# Patient Record
Sex: MALE | Race: WHITE | NOT HISPANIC OR LATINO | Employment: FULL TIME | URBAN - METROPOLITAN AREA
[De-identification: names, ages, dates, MRNs, and addresses within clinical notes are randomized per-mention and may not be internally consistent; named-entity substitution may affect disease eponyms.]

---

## 2023-04-18 ENCOUNTER — APPOINTMENT (OUTPATIENT)
Dept: PRIMARY CARE | Facility: CLINIC | Age: 33
End: 2023-04-18

## 2023-04-18 PROBLEM — F11.11 NARCOTIC ABUSE IN REMISSION (MULTI): Status: ACTIVE | Noted: 2023-04-18

## 2023-04-18 PROBLEM — F15.11 HISTORY OF METHAMPHETAMINE ABUSE (MULTI): Status: ACTIVE | Noted: 2023-04-18

## 2023-04-18 PROBLEM — F41.1 GENERALIZED ANXIETY DISORDER: Status: ACTIVE | Noted: 2023-04-18

## 2023-04-18 PROBLEM — F32.1 MODERATE MAJOR DEPRESSION (MULTI): Status: ACTIVE | Noted: 2023-04-18

## 2023-04-18 PROBLEM — A54.9 GONORRHEA: Status: ACTIVE | Noted: 2023-04-18

## 2023-04-18 PROBLEM — M54.50 INTERMITTENT LOW BACK PAIN: Status: ACTIVE | Noted: 2023-04-18

## 2023-04-18 PROBLEM — R79.89 ELEVATED LFTS: Status: ACTIVE | Noted: 2023-04-18

## 2023-04-18 RX ORDER — SERTRALINE HYDROCHLORIDE 50 MG/1
50 TABLET, FILM COATED ORAL DAILY
COMMUNITY
End: 2024-02-08 | Stop reason: SDUPTHER

## 2023-04-18 RX ORDER — BUPROPION HYDROCHLORIDE 300 MG/1
300 TABLET ORAL DAILY
COMMUNITY
End: 2024-02-08 | Stop reason: SDUPTHER

## 2023-04-18 RX ORDER — TERBINAFINE HYDROCHLORIDE 250 MG/1
1 TABLET ORAL DAILY
COMMUNITY
Start: 2023-01-17 | End: 2024-02-12 | Stop reason: WASHOUT

## 2024-02-08 ENCOUNTER — TELEPHONE (OUTPATIENT)
Dept: PRIMARY CARE | Facility: CLINIC | Age: 34
End: 2024-02-08

## 2024-02-08 DIAGNOSIS — F41.1 GENERALIZED ANXIETY DISORDER: ICD-10-CM

## 2024-02-08 DIAGNOSIS — F32.1 MODERATE MAJOR DEPRESSION (MULTI): ICD-10-CM

## 2024-02-08 RX ORDER — SERTRALINE HYDROCHLORIDE 50 MG/1
50 TABLET, FILM COATED ORAL DAILY
Qty: 90 TABLET | Refills: 1 | Status: SHIPPED | OUTPATIENT
Start: 2024-02-08 | End: 2024-02-12 | Stop reason: SDUPTHER

## 2024-02-08 RX ORDER — BUPROPION HYDROCHLORIDE 300 MG/1
300 TABLET ORAL DAILY
Qty: 90 TABLET | Refills: 1 | Status: SHIPPED | OUTPATIENT
Start: 2024-02-08

## 2024-02-08 NOTE — TELEPHONE ENCOUNTER
Rx Refill Request Telephone Encounter    Name:  Nahid Ash  :  [id number]  Medication Name:      buPROPion XL (Wellbutrin XL) 300 mg 24 hr tablet   Route: Take 1 tablet (300 mg) by mouth once daily    sertraline (Zoloft) 50 mg tablet   Route: Take 1 tablet (50 mg) by mouth once daily.      Specific Pharmacy location:  [id number] [place] [address]   Date of last appointment:  23  Date of next appointment: 24  Best number to reach patient: [phone]

## 2024-02-12 ENCOUNTER — OFFICE VISIT (OUTPATIENT)
Dept: PRIMARY CARE | Facility: CLINIC | Age: 34
End: 2024-02-12

## 2024-02-12 VITALS
OXYGEN SATURATION: 94 % | SYSTOLIC BLOOD PRESSURE: 132 MMHG | HEIGHT: 70 IN | WEIGHT: 221.2 LBS | HEART RATE: 91 BPM | DIASTOLIC BLOOD PRESSURE: 87 MMHG | TEMPERATURE: 97.6 F | RESPIRATION RATE: 18 BRPM | BODY MASS INDEX: 31.67 KG/M2

## 2024-02-12 DIAGNOSIS — Z00.00 ROUTINE MEDICAL EXAM: ICD-10-CM

## 2024-02-12 DIAGNOSIS — Z13.29 THYROID DISORDER SCREEN: ICD-10-CM

## 2024-02-12 DIAGNOSIS — E78.00 ELEVATED LDL CHOLESTEROL LEVEL: ICD-10-CM

## 2024-02-12 DIAGNOSIS — F41.1 GENERALIZED ANXIETY DISORDER: ICD-10-CM

## 2024-02-12 DIAGNOSIS — H61.23 BILATERAL IMPACTED CERUMEN: ICD-10-CM

## 2024-02-12 DIAGNOSIS — R06.83 SNORING: Primary | ICD-10-CM

## 2024-02-12 PROCEDURE — 1036F TOBACCO NON-USER: CPT | Performed by: NURSE PRACTITIONER

## 2024-02-12 PROCEDURE — 99213 OFFICE O/P EST LOW 20 MIN: CPT | Performed by: NURSE PRACTITIONER

## 2024-02-12 PROCEDURE — 69209 REMOVE IMPACTED EAR WAX UNI: CPT | Performed by: NURSE PRACTITIONER

## 2024-02-12 PROCEDURE — 99395 PREV VISIT EST AGE 18-39: CPT | Performed by: NURSE PRACTITIONER

## 2024-02-12 RX ORDER — SERTRALINE HYDROCHLORIDE 25 MG/1
25 TABLET, FILM COATED ORAL DAILY
Start: 2024-02-12

## 2024-02-12 ASSESSMENT — ENCOUNTER SYMPTOMS
FEVER: 0
CHILLS: 0
NERVOUS/ANXIOUS: 0
CONSTITUTIONAL NEGATIVE: 1
VOMITING: 0
APNEA: 0
PALPITATIONS: 0
HEADACHES: 0
CONFUSION: 0
RESPIRATORY NEGATIVE: 1
ACTIVITY CHANGE: 0
NAUSEA: 0
WEAKNESS: 0
FATIGUE: 0
DIZZINESS: 0
ABDOMINAL PAIN: 0
DIARRHEA: 0
SHORTNESS OF BREATH: 0
COUGH: 0

## 2024-02-12 NOTE — ASSESSMENT & PLAN NOTE
Taper down sertraline. Patient wants to trial   Decrease to 25mg daily   Continue wellbutrin   Follow up 6-8 weeks prn otherwise in 6 months   Call for worsening anxiety      For assistance with mental health or substance use concerns, the Mental Health and Recovery Board of [facility] ([email or web address]-[email or web address]/) has two 24 hour assistance lines:  Mental Health: 642-938-HELP (5082) or [phone]  Addiction Helpline: [phone]

## 2024-02-12 NOTE — ASSESSMENT & PLAN NOTE
Ear irrigation in office   Follow up prn   Unsuccessful   Debrox gtts, 1 week nurse visit for irrigation   Felt dizziness during irrigation / stopped and offered water.   Hearing muffled   Not able to visualize TM

## 2024-02-12 NOTE — PROGRESS NOTES
"Subjective   Patient ID: Nahid Ash is a 33 y.o. male who presents for Annual Exam, Snoring, and Ear Fullness (Reports both ears being clogged past month ).    Snoring:   Sleeps typically 8 hours a night   Wife reports snoring   No gasping, or apnea noted    Ears feel Full: Pressure,popping   For a month now   Tinnitus   Denies any recent viral illness   Denies sore throat     Ear Fullness   Pertinent negatives include no abdominal pain, coughing, diarrhea, headaches or vomiting.        Review of Systems   Constitutional: Negative.  Negative for activity change, chills, fatigue and fever.   Respiratory: Negative.  Negative for apnea, cough and shortness of breath.    Cardiovascular:  Negative for chest pain and palpitations.   Gastrointestinal:  Negative for abdominal pain, diarrhea, nausea and vomiting.   Neurological:  Negative for dizziness, weakness and headaches.   Psychiatric/Behavioral:  Negative for confusion. The patient is not nervous/anxious.        Objective   /87   Pulse 91   Temp 36.4 °C (97.6 °F) (Temporal)   Resp 18   Ht 1.778 m (5' 10\")   Wt 100 kg (221 lb 3.2 oz)   SpO2 94%   BMI 31.74 kg/m²     Physical Exam  Vitals reviewed.   Constitutional:       Appearance: Normal appearance.   HENT:      Right Ear: There is impacted cerumen.      Left Ear: There is impacted cerumen.   Cardiovascular:      Rate and Rhythm: Normal rate and regular rhythm.      Pulses: Normal pulses.      Heart sounds: Normal heart sounds.   Pulmonary:      Effort: Pulmonary effort is normal.      Breath sounds: Normal breath sounds.   Neurological:      Mental Status: He is alert and oriented to person, place, and time.   Psychiatric:         Mood and Affect: Mood normal.         Behavior: Behavior normal.     Patient ID: Nahid Ash is a 33 y.o. male.    Ear Cerumen Removal    Date/Time: 2/12/2024 5:24 PM    Performed by: SPENCER Medina  Authorized by: SPENCER Medina    Consent:     " Consent obtained:  Verbal    Consent given by:  Patient    Risks, benefits, and alternatives were discussed: yes      Risks discussed:  Bleeding, TM perforation, dizziness and pain    Alternatives discussed:  No treatment  Universal protocol:     Patient identity confirmed:  Verbally with patient  Procedure details:     Location:  L ear and R ear    Procedure type: irrigation      Procedure outcomes: cerumen removed    Post-procedure details:     Inspection:  No bleeding and some cerumen remaining    Hearing quality:  Diminished    Procedure completion:  Tolerated  Comments:      Not removed in bilateral ears. Heavy cerumen   Recommend debrox      Assessment/Plan   Problem List Items Addressed This Visit             ICD-10-CM    Generalized anxiety disorder F41.1     Taper down sertraline. Patient wants to trial   Decrease to 25mg daily   Continue wellbutrin   Follow up 6-8 weeks prn otherwise in 6 months   Call for worsening anxiety      For assistance with mental health or substance use concerns, the Mental Health and Recovery Board of [facility] ([email or web address]-[email or web address]/) has two 24 hour assistance lines:  Mental Health: 994-380-HELP (4675) or [phone]  Addiction Helpline: [phone]             Relevant Medications    sertraline (Zoloft) 25 mg tablet    Bilateral impacted cerumen H61.23     Ear irrigation in office   Follow up prn   Unsuccessful   Debrox gtts, 1 week nurse visit for irrigation   Felt dizziness during irrigation / stopped and offered water.   Hearing muffled            Relevant Medications    carbamide peroxide (Debrox) 6.5 % otic solution    Other Relevant Orders    Ear Cerumen Removal    Snoring - Primary R06.83     Sleep study ordered   Follow up prn   Trial positioning changes            Relevant Orders    In-Center Sleep Study (Non-Sleep Provider Only)    Elevated LDL cholesterol level E78.00     Lipid panel ordered   Low saturated fat diet            Relevant Orders     CBC and Auto Differential    Comprehensive Metabolic Panel    Lipid Panel    Routine medical exam Z00.00     PE done   Follow up 6 months          Other Visit Diagnoses         Codes    Thyroid disorder screen     Z13.29    Relevant Orders    TSH with reflex to Free T4 if abnormal

## 2024-02-19 ENCOUNTER — CLINICAL SUPPORT (OUTPATIENT)
Dept: PRIMARY CARE | Facility: CLINIC | Age: 34
End: 2024-02-19

## 2024-02-19 DIAGNOSIS — H61.23 IMPACTED CERUMEN OF BOTH EARS: ICD-10-CM

## 2024-02-19 NOTE — PROGRESS NOTES
Patient ID: Nahid Ash is a 33 y.o. male.    Ear Cerumen Removal    Date/Time: 2/19/2024 5:02 PM    Performed by: Saji Matute MA  Authorized by: SPENCER Medina    Consent:     Consent obtained:  Verbal    Consent given by:  Patient    Risks discussed:  Bleeding, incomplete removal, pain and dizziness  Procedure details:     Location:  L ear and R ear    Procedure type: irrigation      Procedure outcomes: unable to remove cerumen    Post-procedure details:     Inspection:  Some cerumen remaining    Hearing quality:  Improved    Procedure completion:  Tolerated  Patient in today for ear irrigation. Patient has been using debrox the past week. Ear irrigation done and removed impacted cerumen completely from left ear. Removed some impacted cerumen from right ear but still impacted. Told patient to continue debrox and follow up if still having issues.

## 2024-08-08 ENCOUNTER — TELEPHONE (OUTPATIENT)
Dept: PRIMARY CARE | Facility: CLINIC | Age: 34
End: 2024-08-08

## 2024-08-08 DIAGNOSIS — F32.1 MODERATE MAJOR DEPRESSION (MULTI): ICD-10-CM

## 2024-08-08 RX ORDER — BUPROPION HYDROCHLORIDE 300 MG/1
300 TABLET ORAL DAILY
Qty: 30 TABLET | Refills: 0 | Status: SHIPPED | OUTPATIENT
Start: 2024-08-08

## 2024-08-08 NOTE — TELEPHONE ENCOUNTER
Rx Refill Request Telephone Encounter    Name:  Nahid Ash  :  [id number]  Medication Name:      buPROPion XL (Wellbutrin XL) 300 mg 24 hr tablet   Route: Take 1 tablet (300 mg) by mouth once daily.       Specific Pharmacy location:  [id number] - [address]   Date of last appointment:  24  Date of next appointment:  24  Best number to reach patient: [phone]

## 2024-08-12 ENCOUNTER — APPOINTMENT (OUTPATIENT)
Dept: PRIMARY CARE | Facility: CLINIC | Age: 34
End: 2024-08-12

## 2024-08-30 ENCOUNTER — APPOINTMENT (OUTPATIENT)
Dept: PRIMARY CARE | Facility: CLINIC | Age: 34
End: 2024-08-30

## 2024-09-03 ENCOUNTER — APPOINTMENT (OUTPATIENT)
Dept: PRIMARY CARE | Facility: CLINIC | Age: 34
End: 2024-09-03

## 2024-09-16 ENCOUNTER — APPOINTMENT (OUTPATIENT)
Dept: PRIMARY CARE | Facility: CLINIC | Age: 34
End: 2024-09-16